# Patient Record
Sex: MALE | Race: WHITE | NOT HISPANIC OR LATINO | Employment: FULL TIME | URBAN - METROPOLITAN AREA
[De-identification: names, ages, dates, MRNs, and addresses within clinical notes are randomized per-mention and may not be internally consistent; named-entity substitution may affect disease eponyms.]

---

## 2020-07-03 ENCOUNTER — OFFICE VISIT (OUTPATIENT)
Dept: URGENT CARE | Facility: CLINIC | Age: 30
End: 2020-07-03
Payer: COMMERCIAL

## 2020-07-03 VITALS
TEMPERATURE: 97.1 F | SYSTOLIC BLOOD PRESSURE: 136 MMHG | OXYGEN SATURATION: 98 % | DIASTOLIC BLOOD PRESSURE: 84 MMHG | HEIGHT: 71 IN | RESPIRATION RATE: 16 BRPM | WEIGHT: 310 LBS | BODY MASS INDEX: 43.4 KG/M2 | HEART RATE: 78 BPM

## 2020-07-03 DIAGNOSIS — M54.50 ACUTE LEFT-SIDED LOW BACK PAIN WITHOUT SCIATICA: ICD-10-CM

## 2020-07-03 DIAGNOSIS — M62.830 SPASM OF MUSCLE OF LOWER BACK: Primary | ICD-10-CM

## 2020-07-03 LAB
SL AMB  POCT GLUCOSE, UA: NEGATIVE
SL AMB LEUKOCYTE ESTERASE,UA: NEGATIVE
SL AMB POCT BILIRUBIN,UA: NEGATIVE
SL AMB POCT BLOOD,UA: NEGATIVE
SL AMB POCT CLARITY,UA: CLEAR
SL AMB POCT COLOR,UA: NORMAL
SL AMB POCT KETONES,UA: NORMAL
SL AMB POCT NITRITE,UA: NEGATIVE
SL AMB POCT PH,UA: 6
SL AMB POCT SPECIFIC GRAVITY,UA: 1.03
SL AMB POCT URINE PROTEIN: NORMAL
SL AMB POCT UROBILINOGEN: 0.2

## 2020-07-03 PROCEDURE — G0382 LEV 3 HOSP TYPE B ED VISIT: HCPCS | Performed by: PHYSICIAN ASSISTANT

## 2020-07-03 RX ORDER — CYCLOBENZAPRINE HCL 10 MG
10 TABLET ORAL 3 TIMES DAILY PRN
Qty: 21 TABLET | Refills: 0 | Status: SHIPPED | OUTPATIENT
Start: 2020-07-03 | End: 2020-07-10

## 2020-07-03 NOTE — PROGRESS NOTES
NAME: Holger Coffman is a 26 y o  male  : 1990    MRN: [id number]      Assessment and Plan   Spasm of muscle of lower back [M62 830]  1  Spasm of muscle of lower back  cyclobenzaprine (FLEXERIL) 10 mg tablet   2  Acute left-sided low back pain without sciatica  POCT urine dip auto non-scope    Exam finding is consistent with spasm lower back   At this time will provide patient with Rx Flexeril muscle relaxer  Take medication as noted  Advised patient not to take while driving or at work causes drowsiness  Recommend use of OTC  ibuprofen every 8 hours  May take OTC Tylenol for additional pain relief  Recommend warm compress 2-3 times daily  Gentle ROM  Patient understands and agrees with treatment plans  Gageelylanie Barakatkeya was seen today for back pain  Diagnoses and all orders for this visit:    Spasm of muscle of lower back  -     cyclobenzaprine (FLEXERIL) 10 mg tablet; Take 1 tablet (10 mg total) by mouth 3 (three) times a day as needed for muscle spasms for up to 7 days    Acute left-sided low back pain without sciatica  -     POCT urine dip auto non-scope        Patient Instructions   There are no Patient Instructions on file for this visit  Proceed to ER if symptoms worsen  Chief Complaint     Chief Complaint   Patient presents with    Back Pain     began three days ago  constant stabbing pain in his left flank         History of Present Illness     26Year old Pt presents with for left flank pain x 3 days  Pt reports 8/10 radiates to buttock area, constant  Has taken OTC aleve, motrin, CBD ointment without relief  Patient denies provocative measures  Denies ecchymoses, swelling, open wound, bleeding  Denies rash or skin lesions  Denies leg pain/numbness/tingling/weakness  Denies other injuries from the incident  Denies abdominal pain, n/v/d/c  Denies urinary symptoms  Denies loss of bowel or bladder function  Denies history of back problems           Review of Systems Review of Systems   Constitutional: Negative  Respiratory: Negative  Cardiovascular: Negative  Musculoskeletal: Positive for back pain  Current Medications       Current Outpatient Medications:     cyclobenzaprine (FLEXERIL) 10 mg tablet, Take 1 tablet (10 mg total) by mouth 3 (three) times a day as needed for muscle spasms for up to 7 days, Disp: 21 tablet, Rfl: 0    Current Allergies     Allergies as of 07/03/2020    (No Known Allergies)              Past Medical History:   Diagnosis Date    Patient denies medical problems        No past surgical history on file  No family history on file  Medications have been verified  The following portions of the patient's history were reviewed and updated as appropriate: allergies, current medications, past family history, past medical history, past social history, past surgical history and problem list     Objective   /84   Pulse 78   Temp (!) 97 1 °F (36 2 °C)   Resp 16   Ht 5' 10 5" (1 791 m)   Wt (!) 141 kg (310 lb)   SpO2 98%   BMI 43 85 kg/m²      Physical Exam     Physical Exam   Constitutional: He is oriented to person, place, and time  He appears well-developed  No distress  Cardiovascular: Normal rate, regular rhythm, normal heart sounds and intact distal pulses  Exam reveals no gallop and no friction rub  No murmur heard  Pulmonary/Chest: Effort normal and breath sounds normal  No stridor  No respiratory distress  He has no wheezes  He has no rales  He exhibits no tenderness  Musculoskeletal:        Lumbar back: He exhibits tenderness (Left-sided PVMs) and spasm (Left-sided muscle)  He exhibits normal range of motion (FAROM with pain), no bony tenderness, no swelling, no edema, no deformity, no laceration, no pain and normal pulse  Neurological: He is alert and oriented to person, place, and time  No cranial nerve deficit  Nursing note and vitals reviewed        Melissa Solomon PA-C

## 2020-07-08 ENCOUNTER — TELEPHONE (OUTPATIENT)
Dept: URGENT CARE | Facility: CLINIC | Age: 30
End: 2020-07-08

## 2020-07-08 NOTE — TELEPHONE ENCOUNTER
Called spoke to patient regarding letter requested patient is requesting a letter to be cleared back to work  Patient states that he has been going to work however with job wants to make sure that he is able to to his full duties  Explained to patient that we do not clear patient is back to work  Patient stating that he still have some mild discomfort advised patient to follow-up with PCP for work notes especially if he may need to be placed on work restrictions  Patient voiced understanding

## 2020-07-08 NOTE — TELEPHONE ENCOUNTER
----- Message from Christiana Huerta RN sent at 7/7/2020  3:20 PM EDT -----  Regarding: work note  Contact: [phone]  Pt called and is requesting a work note stating he is ok to go back to work